# Patient Record
Sex: MALE | Race: WHITE | HISPANIC OR LATINO | Employment: UNEMPLOYED | URBAN - NONMETROPOLITAN AREA
[De-identification: names, ages, dates, MRNs, and addresses within clinical notes are randomized per-mention and may not be internally consistent; named-entity substitution may affect disease eponyms.]

---

## 2018-07-16 ENCOUNTER — OFFICE VISIT (OUTPATIENT)
Dept: FAMILY MEDICINE CLINIC | Facility: CLINIC | Age: 58
End: 2018-07-16

## 2018-07-16 VITALS
BODY MASS INDEX: 30.52 KG/M2 | HEIGHT: 71 IN | DIASTOLIC BLOOD PRESSURE: 72 MMHG | SYSTOLIC BLOOD PRESSURE: 120 MMHG | WEIGHT: 218 LBS | TEMPERATURE: 98.9 F | OXYGEN SATURATION: 98 % | HEART RATE: 71 BPM

## 2018-07-16 DIAGNOSIS — H61.23 BILATERAL IMPACTED CERUMEN: Primary | ICD-10-CM

## 2018-07-16 PROCEDURE — 99212 OFFICE O/P EST SF 10 MIN: CPT | Performed by: FAMILY MEDICINE

## 2018-07-16 PROCEDURE — 69209 REMOVE IMPACTED EAR WAX UNI: CPT | Performed by: FAMILY MEDICINE

## 2018-07-16 NOTE — PROGRESS NOTES
" Subjective   Varun Mills is a 58 y.o. male.     History of Present Illness     He thought he lost a q-tip end in right ear.       Review of Systems   Constitutional: Negative for chills, fatigue and fever.   HENT: Negative for congestion, ear discharge, ear pain, facial swelling, hearing loss, postnasal drip, rhinorrhea, sinus pressure, sore throat, trouble swallowing and voice change.    Eyes: Negative for discharge, redness and visual disturbance.   Respiratory: Negative for cough, chest tightness, shortness of breath and wheezing.    Cardiovascular: Negative for chest pain and palpitations.   Gastrointestinal: Negative for abdominal pain, blood in stool, constipation, diarrhea, nausea and vomiting.   Endocrine: Negative for polydipsia and polyuria.   Genitourinary: Negative for dysuria, flank pain, hematuria and urgency.   Musculoskeletal: Negative for arthralgias, back pain, joint swelling and myalgias.   Skin: Negative for rash.   Neurological: Negative for dizziness, weakness, numbness and headaches.   Hematological: Negative for adenopathy.   Psychiatric/Behavioral: Negative for confusion and sleep disturbance. The patient is not nervous/anxious.            /72 (BP Location: Right arm, Patient Position: Sitting, Cuff Size: Adult)   Pulse 71   Temp 98.9 °F (37.2 °C) (Temporal Artery )   Ht 179.5 cm (70.67\")   Wt 98.9 kg (218 lb)   SpO2 98%   BMI 30.69 kg/m²       Objective     Physical Exam   Constitutional: He is oriented to person, place, and time. He appears well-developed and well-nourished.   HENT:   Head: Normocephalic and atraumatic.   Right Ear: External ear normal.   Left Ear: External ear normal.   Nose: Nose normal.   Cerumen impaction both ears, no cotton swab   Eyes: Conjunctivae and EOM are normal. Pupils are equal, round, and reactive to light.   Neck: Normal range of motion.   Pulmonary/Chest: Effort normal.   Musculoskeletal: Normal range of motion.   Neurological: He is alert " and oriented to person, place, and time.   Psychiatric: He has a normal mood and affect. His behavior is normal. Judgment and thought content normal.   Nursing note and vitals reviewed.          PAST MEDICAL HISTORY   No past medical history on file.   PAST SURGICAL HISTORY   No past surgical history on file.   SOCIAL HISTORY     Social History     Social History   • Marital status:      Social History Main Topics   • Smoking status: Smoker, Current Status Unknown     Packs/day: 2.00     Types: Cigarettes     Start date: 7/16/1981   • Smokeless tobacco: Never Used   • Alcohol use Yes      Comment: OCCASIONAL    • Drug use: No   • Sexual activity: Defer     Other Topics Concern   • Not on file      ALLERGIES   Patient has no known allergies.   MEDICATIONS     No current outpatient prescriptions on file.     No current facility-administered medications for this visit.         The following portions of the patient's history were reviewed and updated as appropriate: allergies, current medications, past family history, past medical history, past social history, past surgical history and problem list.        Assessment/Plan   Varun was seen today for foreign body in ear.    Diagnoses and all orders for this visit:    Bilateral impacted cerumen      PROCEDURE NOTE:  WARM WATER WITH H2O2 USED TO IRRIGATE BOTH EARS.  I HELPED. TM'S INTACT.                      No Follow-up on file.                  This document has been electronically signed by Darwin Abreu MD on July 16, 2018 5:15 PM